# Patient Record
Sex: MALE | Race: WHITE | NOT HISPANIC OR LATINO | URBAN - METROPOLITAN AREA
[De-identification: names, ages, dates, MRNs, and addresses within clinical notes are randomized per-mention and may not be internally consistent; named-entity substitution may affect disease eponyms.]

---

## 2022-09-13 ENCOUNTER — INPATIENT (INPATIENT)
Facility: HOSPITAL | Age: 37
LOS: 0 days | Discharge: HOME | End: 2022-09-14
Attending: SURGERY | Admitting: SURGERY

## 2022-09-13 VITALS
TEMPERATURE: 100 F | HEART RATE: 90 BPM | SYSTOLIC BLOOD PRESSURE: 123 MMHG | OXYGEN SATURATION: 100 % | RESPIRATION RATE: 18 BRPM | WEIGHT: 169.98 LBS | DIASTOLIC BLOOD PRESSURE: 76 MMHG

## 2022-09-13 LAB
ALBUMIN SERPL ELPH-MCNC: 4.4 G/DL — SIGNIFICANT CHANGE UP (ref 3.5–5.2)
ALP SERPL-CCNC: 65 U/L — SIGNIFICANT CHANGE UP (ref 30–115)
ALT FLD-CCNC: 18 U/L — SIGNIFICANT CHANGE UP (ref 0–41)
ANION GAP SERPL CALC-SCNC: 11 MMOL/L — SIGNIFICANT CHANGE UP (ref 7–14)
APPEARANCE UR: CLEAR — SIGNIFICANT CHANGE UP
APTT BLD: 27.6 SEC — SIGNIFICANT CHANGE UP (ref 27–39.2)
AST SERPL-CCNC: 20 U/L — SIGNIFICANT CHANGE UP (ref 0–41)
BASOPHILS # BLD AUTO: 0.05 K/UL — SIGNIFICANT CHANGE UP (ref 0–0.2)
BASOPHILS NFR BLD AUTO: 0.4 % — SIGNIFICANT CHANGE UP (ref 0–1)
BILIRUB DIRECT SERPL-MCNC: <0.2 MG/DL — SIGNIFICANT CHANGE UP (ref 0–0.3)
BILIRUB INDIRECT FLD-MCNC: >0.6 MG/DL — SIGNIFICANT CHANGE UP (ref 0.2–1.2)
BILIRUB SERPL-MCNC: 0.8 MG/DL — SIGNIFICANT CHANGE UP (ref 0.2–1.2)
BILIRUB UR-MCNC: NEGATIVE — SIGNIFICANT CHANGE UP
BLD GP AB SCN SERPL QL: SIGNIFICANT CHANGE UP
BUN SERPL-MCNC: 10 MG/DL — SIGNIFICANT CHANGE UP (ref 10–20)
CALCIUM SERPL-MCNC: 9.3 MG/DL — SIGNIFICANT CHANGE UP (ref 8.4–10.4)
CHLORIDE SERPL-SCNC: 100 MMOL/L — SIGNIFICANT CHANGE UP (ref 98–110)
CO2 SERPL-SCNC: 24 MMOL/L — SIGNIFICANT CHANGE UP (ref 17–32)
COLOR SPEC: SIGNIFICANT CHANGE UP
CREAT SERPL-MCNC: 1 MG/DL — SIGNIFICANT CHANGE UP (ref 0.7–1.5)
DIFF PNL FLD: NEGATIVE — SIGNIFICANT CHANGE UP
EGFR: 101 ML/MIN/1.73M2 — SIGNIFICANT CHANGE UP
EOSINOPHIL # BLD AUTO: 0.1 K/UL — SIGNIFICANT CHANGE UP (ref 0–0.7)
EOSINOPHIL NFR BLD AUTO: 0.7 % — SIGNIFICANT CHANGE UP (ref 0–8)
GLUCOSE SERPL-MCNC: 104 MG/DL — HIGH (ref 70–99)
GLUCOSE UR QL: NEGATIVE — SIGNIFICANT CHANGE UP
HCT VFR BLD CALC: 41.3 % — LOW (ref 42–52)
HGB BLD-MCNC: 14.3 G/DL — SIGNIFICANT CHANGE UP (ref 14–18)
IMM GRANULOCYTES NFR BLD AUTO: 0.3 % — SIGNIFICANT CHANGE UP (ref 0.1–0.3)
INR BLD: 1.04 RATIO — SIGNIFICANT CHANGE UP (ref 0.65–1.3)
KETONES UR-MCNC: ABNORMAL
LACTATE SERPL-SCNC: 0.7 MMOL/L — SIGNIFICANT CHANGE UP (ref 0.7–2)
LEUKOCYTE ESTERASE UR-ACNC: NEGATIVE — SIGNIFICANT CHANGE UP
LIDOCAIN IGE QN: 27 U/L — SIGNIFICANT CHANGE UP (ref 7–60)
LYMPHOCYTES # BLD AUTO: 1.49 K/UL — SIGNIFICANT CHANGE UP (ref 1.2–3.4)
LYMPHOCYTES # BLD AUTO: 10.9 % — LOW (ref 20.5–51.1)
MCHC RBC-ENTMCNC: 30.7 PG — SIGNIFICANT CHANGE UP (ref 27–31)
MCHC RBC-ENTMCNC: 34.6 G/DL — SIGNIFICANT CHANGE UP (ref 32–37)
MCV RBC AUTO: 88.6 FL — SIGNIFICANT CHANGE UP (ref 80–94)
MONOCYTES # BLD AUTO: 1.45 K/UL — HIGH (ref 0.1–0.6)
MONOCYTES NFR BLD AUTO: 10.6 % — HIGH (ref 1.7–9.3)
NEUTROPHILS # BLD AUTO: 10.53 K/UL — HIGH (ref 1.4–6.5)
NEUTROPHILS NFR BLD AUTO: 77.1 % — HIGH (ref 42.2–75.2)
NITRITE UR-MCNC: NEGATIVE — SIGNIFICANT CHANGE UP
NRBC # BLD: 0 /100 WBCS — SIGNIFICANT CHANGE UP (ref 0–0)
PH UR: 7 — SIGNIFICANT CHANGE UP (ref 5–8)
PLATELET # BLD AUTO: 201 K/UL — SIGNIFICANT CHANGE UP (ref 130–400)
POTASSIUM SERPL-MCNC: 4.3 MMOL/L — SIGNIFICANT CHANGE UP (ref 3.5–5)
POTASSIUM SERPL-SCNC: 4.3 MMOL/L — SIGNIFICANT CHANGE UP (ref 3.5–5)
PROT SERPL-MCNC: 6.9 G/DL — SIGNIFICANT CHANGE UP (ref 6–8)
PROT UR-MCNC: NEGATIVE — SIGNIFICANT CHANGE UP
PROTHROM AB SERPL-ACNC: 12 SEC — SIGNIFICANT CHANGE UP (ref 9.95–12.87)
RBC # BLD: 4.66 M/UL — LOW (ref 4.7–6.1)
RBC # FLD: 12.1 % — SIGNIFICANT CHANGE UP (ref 11.5–14.5)
SARS-COV-2 RNA SPEC QL NAA+PROBE: SIGNIFICANT CHANGE UP
SODIUM SERPL-SCNC: 135 MMOL/L — SIGNIFICANT CHANGE UP (ref 135–146)
SP GR SPEC: 1.02 — SIGNIFICANT CHANGE UP (ref 1.01–1.03)
UROBILINOGEN FLD QL: SIGNIFICANT CHANGE UP
WBC # BLD: 13.66 K/UL — HIGH (ref 4.8–10.8)
WBC # FLD AUTO: 13.66 K/UL — HIGH (ref 4.8–10.8)

## 2022-09-13 PROCEDURE — 99285 EMERGENCY DEPT VISIT HI MDM: CPT

## 2022-09-13 PROCEDURE — 71045 X-RAY EXAM CHEST 1 VIEW: CPT | Mod: 26

## 2022-09-13 PROCEDURE — 93010 ELECTROCARDIOGRAM REPORT: CPT

## 2022-09-13 PROCEDURE — 88304 TISSUE EXAM BY PATHOLOGIST: CPT | Mod: 26

## 2022-09-13 PROCEDURE — 74177 CT ABD & PELVIS W/CONTRAST: CPT | Mod: 26,MA

## 2022-09-13 RX ORDER — METRONIDAZOLE 500 MG
500 TABLET ORAL ONCE
Refills: 0 | Status: DISCONTINUED | OUTPATIENT
Start: 2022-09-13 | End: 2022-09-14

## 2022-09-13 RX ORDER — KETOROLAC TROMETHAMINE 30 MG/ML
15 SYRINGE (ML) INJECTION ONCE
Refills: 0 | Status: DISCONTINUED | OUTPATIENT
Start: 2022-09-13 | End: 2022-09-13

## 2022-09-13 RX ORDER — HYDROMORPHONE HYDROCHLORIDE 2 MG/ML
0.5 INJECTION INTRAMUSCULAR; INTRAVENOUS; SUBCUTANEOUS
Refills: 0 | Status: DISCONTINUED | OUTPATIENT
Start: 2022-09-13 | End: 2022-09-14

## 2022-09-13 RX ORDER — HYDROMORPHONE HYDROCHLORIDE 2 MG/ML
1 INJECTION INTRAMUSCULAR; INTRAVENOUS; SUBCUTANEOUS
Refills: 0 | Status: DISCONTINUED | OUTPATIENT
Start: 2022-09-13 | End: 2022-09-14

## 2022-09-13 RX ORDER — OXYCODONE HYDROCHLORIDE 5 MG/1
5 TABLET ORAL EVERY 6 HOURS
Refills: 0 | Status: DISCONTINUED | OUTPATIENT
Start: 2022-09-13 | End: 2022-09-14

## 2022-09-13 RX ORDER — CEFOTETAN DISODIUM 1 G
2 VIAL (EA) INJECTION ONCE
Refills: 0 | Status: DISCONTINUED | OUTPATIENT
Start: 2022-09-13 | End: 2022-09-13

## 2022-09-13 RX ORDER — CIPROFLOXACIN LACTATE 400MG/40ML
400 VIAL (ML) INTRAVENOUS ONCE
Refills: 0 | Status: COMPLETED | OUTPATIENT
Start: 2022-09-13 | End: 2022-09-13

## 2022-09-13 RX ORDER — KETOROLAC TROMETHAMINE 30 MG/ML
30 SYRINGE (ML) INJECTION EVERY 6 HOURS
Refills: 0 | Status: DISCONTINUED | OUTPATIENT
Start: 2022-09-13 | End: 2022-09-14

## 2022-09-13 RX ORDER — ACETAMINOPHEN 500 MG
650 TABLET ORAL EVERY 6 HOURS
Refills: 0 | Status: DISCONTINUED | OUTPATIENT
Start: 2022-09-13 | End: 2022-09-14

## 2022-09-13 RX ORDER — SODIUM CHLORIDE 9 MG/ML
1000 INJECTION, SOLUTION INTRAVENOUS
Refills: 0 | Status: DISCONTINUED | OUTPATIENT
Start: 2022-09-13 | End: 2022-09-13

## 2022-09-13 RX ORDER — SODIUM CHLORIDE 9 MG/ML
1000 INJECTION, SOLUTION INTRAVENOUS
Refills: 0 | Status: DISCONTINUED | OUTPATIENT
Start: 2022-09-13 | End: 2022-09-14

## 2022-09-13 RX ORDER — ENOXAPARIN SODIUM 100 MG/ML
40 INJECTION SUBCUTANEOUS EVERY 24 HOURS
Refills: 0 | Status: DISCONTINUED | OUTPATIENT
Start: 2022-09-13 | End: 2022-09-14

## 2022-09-13 RX ORDER — PANTOPRAZOLE SODIUM 20 MG/1
40 TABLET, DELAYED RELEASE ORAL
Refills: 0 | Status: DISCONTINUED | OUTPATIENT
Start: 2022-09-13 | End: 2022-09-14

## 2022-09-13 RX ORDER — ONDANSETRON 8 MG/1
4 TABLET, FILM COATED ORAL ONCE
Refills: 0 | Status: DISCONTINUED | OUTPATIENT
Start: 2022-09-13 | End: 2022-09-14

## 2022-09-13 RX ORDER — CEFOTETAN DISODIUM 1 G
VIAL (EA) INJECTION
Refills: 0 | Status: DISCONTINUED | OUTPATIENT
Start: 2022-09-13 | End: 2022-09-13

## 2022-09-13 RX ORDER — MEPERIDINE HYDROCHLORIDE 50 MG/ML
12.5 INJECTION INTRAMUSCULAR; INTRAVENOUS; SUBCUTANEOUS
Refills: 0 | Status: DISCONTINUED | OUTPATIENT
Start: 2022-09-13 | End: 2022-09-14

## 2022-09-13 RX ORDER — ENOXAPARIN SODIUM 100 MG/ML
40 INJECTION SUBCUTANEOUS EVERY 24 HOURS
Refills: 0 | Status: DISCONTINUED | OUTPATIENT
Start: 2022-09-13 | End: 2022-09-13

## 2022-09-13 RX ADMIN — Medication 15 MILLIGRAM(S): at 14:57

## 2022-09-13 RX ADMIN — Medication 30 MILLIGRAM(S): at 22:59

## 2022-09-13 RX ADMIN — Medication 30 MILLIGRAM(S): at 23:59

## 2022-09-13 RX ADMIN — Medication 200 MILLIGRAM(S): at 19:58

## 2022-09-13 NOTE — H&P ADULT - NSHPLABSRESULTS_GEN_ALL_CORE
LAB/STUDIES:                        14.3   13.66 )-----------( 201      ( 13 Sep 2022 15:02 )             41.3         135  |  100  |  10  ----------------------------<  104<H>  4.3   |  24  |  1.0    Ca    9.3      13 Sep 2022 15:02    TPro  6.9  /  Alb  4.4  /  TBili  0.8  /  DBili  <0.2  /  AST  20  /  ALT  18  /  AlkPhos  65      PT/INR - ( 13 Sep 2022 15:02 )   PT: 12.00 sec;   INR: 1.04 ratio         PTT - ( 13 Sep 2022 15:02 )  PTT:27.6 sec  LIVER FUNCTIONS - ( 13 Sep 2022 15:02 )  Alb: 4.4 g/dL / Pro: 6.9 g/dL / ALK PHOS: 65 U/L / ALT: 18 U/L / AST: 20 U/L / GGT: x           Urinalysis Basic - ( 13 Sep 2022 15:11 )    Color: Light Yellow / Appearance: Clear / S.017 / pH: x  Gluc: x / Ketone: Small  / Bili: Negative / Urobili: <2 mg/dL   Blood: x / Protein: Negative / Nitrite: Negative   Leuk Esterase: Negative / RBC: x / WBC x   Sq Epi: x / Non Sq Epi: x / Bacteria: x                  IMAGING:  < from: CT Abdomen and Pelvis w/ IV Cont (22 @ 15:38) >  IMPRESSION:    1. Findings are compatible with acute appendicitis with small free pelvic   fluid; no focal drainable fluid collection or free intraperitoneal air.    --- End of Report ---    < end of copied text >

## 2022-09-13 NOTE — ED PROVIDER NOTE - OBJECTIVE STATEMENT
36 year old male, no past medical history, who presents with rlq pain. patient with periumbilical pain that began yesterday, now localized to rlq. patient reports pain constant, non-radiating, sharp. no aggravating/alleviating symptoms. denies fever, chills, chest pain, shortness of breath, back pain, nausea/vomiting/diarrhea, urinary symptoms, testicular pain. no hx similar. no hx abd surgeries.

## 2022-09-13 NOTE — H&P ADULT - ASSESSMENT
36M w/ no PMH/PSH who presents with clinical and radiographic findings consistent with acute appendicitis.    Plan:   -Added on to OR for laparoscopic appendectomy, possible exploratory laparotomy   -Consent in chart   -Admit to surgery service   -NPO, IVF   -IV Cipro/Flagyl 2/2 penicillin allergy   -CXR, EKG   -STAT Covid   -Preop labs reviewed   -Discussed with attending 36M w/ no PMH/PSH who presents with clinical and radiographic findings consistent with acute appendicitis.    Plan:   -Added on to OR for laparoscopic appendectomy, possible exploratory laparotomy   -Consent in chart   -Admit to surgery service   -NPO, IVF   -IV Cipro/Flagyl 2/2 penicillin allergy   -STAT Covid   -Preop labs reviewed   -Discussed with attending

## 2022-09-13 NOTE — H&P ADULT - HISTORY OF PRESENT ILLNESS
SURGERY CONSULT NOTE    Patient: MICHELE ALICEA , 36y ([birth year])Male   MRN: [id number]  Location: [facility] ER Hold 024 A  Visit: 09-13-22 Inpatient  Date: 09-13-22 @ 17:41    HPI: 36M w/ no PMH/PSH who presents with sudden onset of RLQ abdominal pain last night, persisted until this afternoon.  Endorses decreased appetite and PO intake.  Denies associated nausea or emesis, no changes in bowel function, no dysuria.  Patient seen bedside in ED, AAOx3, NAD, conversational, AVSS, abdomen soft, non-distended, tender to light palpation in RLQ.  Labs significant for leukocytosis of 13.  CT performed with findings consistent with acute appendicitis; dilated appendix to 1cm, surrounding inflammatory changes, +appendicolith, no evidence of perforation or abscess.  Patient started on cipro/flagyl in ED secondary to a penicillin allergy, IVF, NPO, preop  labs sent.        PAST MEDICAL & SURGICAL HISTORY:      Home Medications:  none          MEDICATIONS  (STANDING):  ciprofloxacin   IVPB 400 milliGRAM(s) IV Intermittent once  lactated ringers. 1000 milliLiter(s) (125 mL/Hr) IV Continuous <Continuous>  metroNIDAZOLE  IVPB 500 milliGRAM(s) IV Intermittent Once    MEDICATIONS  (PRN):         SURGERY CONSULT NOTE    Patient: MICHELE ALICEA , 36y ([birth year])Male   MRN: [id number]  Location: [facility] ER Hold 024 A  Visit: 09-13-22 Inpatient  Date: 09-13-22 @ 17:41    HPI: 36M w/ no PMH/PSH who presents with sudden onset of RLQ abdominal pain last night, persisted until this afternoon.  Endorses decreased appetite and PO intake.  Denies associated nausea or emesis, no changes in bowel function, no dysuria.  Patient seen bedside in ED, AAOx3, NAD, conversational, AVSS, abdomen soft, non-distended, tender to light palpation in RLQ.  Labs significant for leukocytosis of 13.  CT performed with findings consistent with acute appendicitis; dilated appendix to 1cm, surrounding inflammatory changes, +appendicolith, no evidence of perforation or abscess.  Patient started on cipro/flagyl in ED secondary to a penicillin allergy, IVF, NPO, preop  labs sent.        PAST MEDICAL & SURGICAL HISTORY:  none    Home Medications:  none          MEDICATIONS  (STANDING):  ciprofloxacin   IVPB 400 milliGRAM(s) IV Intermittent once  lactated ringers. 1000 milliLiter(s) (125 mL/Hr) IV Continuous <Continuous>  metroNIDAZOLE  IVPB 500 milliGRAM(s) IV Intermittent Once    MEDICATIONS  (PRN):

## 2022-09-13 NOTE — ED PROVIDER NOTE - CLINICAL SUMMARY MEDICAL DECISION MAKING FREE TEXT BOX
36-year-old male presented to ED for right lower quadrant abdominal pain due to appendicitis.  Surgery was consulted and patient was started on antibiotics.  Patient admitted to go to the OR for appendectomy.

## 2022-09-13 NOTE — ED PROVIDER NOTE - NS ED ROS FT
Review of Systems:  	•	CONSTITUTIONAL: no fever, no chills   	•	SKIN: no rash    	•	ENT: no sore throat   	•	RESPIRATORY: no shortness of breath   	•	CARDIAC: no chest pain   	•	GI: +abd pain, no nausea, no vomiting, no diarrhea   	•	GENITO-URINARY: no discharge, no dysuria; no hematuria, no increased urinary frequency  	•	NEUROLOGIC: no weakness, no headache   	•	PSYCH: no anxiety, non suicidal, non homicidal, no hallucination, no depression

## 2022-09-13 NOTE — PRE-ANESTHESIA EVALUATION ADULT - NSANTHOSAYNRD_GEN_A_CORE
denies/No. ARON screening performed.  STOP BANG Legend: 0-2 = LOW Risk; 3-4 = INTERMEDIATE Risk; 5-8 = HIGH Risk

## 2022-09-13 NOTE — ED PROVIDER NOTE - ATTENDING APP SHARED VISIT CONTRIBUTION OF CARE
36-year-old male presents to ED for right lower abdominal pain.  Patient states it started yesterday and he seems to be getting progressively worse.  It is non-radiating no nausea vomiting diarrhea constipation or testicular pain is swelling no dysuria or hematuria.  No abdominal surgeries.    Const: NAD  Eyes: PERRL, no conjunctival injection  HENT:  Neck supple without meningismus   CV: RRR, Warm, well-perfused extremities  RESP: CTA B/L, no tachypnea   GI: soft, RLQ abdominal pain, non-distended  MSK: No gross deformities appreciated  Skin: Warm, dry. No rashes  Neuro: Alert, CNs II-XII grossly intact. Sensation and motor function of extremities grossly intact.  Psych: Appropriate mood and affect.    concern for appendicitis- will do labs, ct, UA

## 2022-09-13 NOTE — BRIEF OPERATIVE NOTE - OPERATION/FINDINGS
Inflamed appendix. Mesoappendix taken with vascular stapler. Appendix transected with JANN purple load.

## 2022-09-13 NOTE — ED PROVIDER NOTE - PHYSICAL EXAMINATION
CONSTITUTIONAL: Well-developed; well-nourished; in no acute distress, nontoxic appearing  SKIN: skin exam is warm and dry  ENT: MMM  CARD: S1, S2 normal, no murmur  RESP: No wheezes, rales or rhonchi. Good air movement bilaterally  ABD: soft; non-distended, +RLQ TTP, no rebound/guarding. no CVAT   EXT: Normal ROM.   NEURO: awake, alert, following commands, oriented, grossly unremarkable. No Focal deficits. GCS 15.   PSYCH: Cooperative, appropriate.

## 2022-09-13 NOTE — H&P ADULT - NSHPPHYSICALEXAM_GEN_ALL_CORE
VITALS:  T(F): 98.6 (09-13-22 @ 16:42), Max: 99.6 (09-13-22 @ 13:24)  HR: 86 (09-13-22 @ 16:42) (86 - 90)  BP: 126/72 (09-13-22 @ 16:42) (123/76 - 126/72)  RR: 18 (09-13-22 @ 16:42) (18 - 18)  SpO2: 92% (09-13-22 @ 16:42) (92% - 100%)    PHYSICAL EXAM:  General: NAD, AAOx3  Cardiac: RRR  Respiratory: Unlabored breathing at rest  Abdomen: Soft, non-distended, tender in RLQ to light palpation  Musculoskeletal: Strength 5/5 BL UE/LE, ROM intact, compartments soft  Neuro: Sensation grossly intact and equal throughout, no focal deficits  Skin: Warm/dry, normal color, no jaundice

## 2022-09-13 NOTE — ED ADULT TRIAGE NOTE - CHIEF COMPLAINT QUOTE
Patient walk in a+ox4 co back pain since yesterday and RLQ pain today. Pt denies n/v/d, fever. Pt last took tylenol 830am with "some relief".

## 2022-09-14 VITALS
HEART RATE: 55 BPM | OXYGEN SATURATION: 98 % | SYSTOLIC BLOOD PRESSURE: 102 MMHG | DIASTOLIC BLOOD PRESSURE: 60 MMHG | TEMPERATURE: 98 F | RESPIRATION RATE: 16 BRPM

## 2022-09-14 LAB
CULTURE RESULTS: SIGNIFICANT CHANGE UP
SPECIMEN SOURCE: SIGNIFICANT CHANGE UP

## 2022-09-14 RX ORDER — OXYCODONE HYDROCHLORIDE 5 MG/1
1 TABLET ORAL
Qty: 5 | Refills: 0
Start: 2022-09-14

## 2022-09-14 RX ADMIN — Medication 650 MILLIGRAM(S): at 05:57

## 2022-09-14 RX ADMIN — ENOXAPARIN SODIUM 40 MILLIGRAM(S): 100 INJECTION SUBCUTANEOUS at 05:28

## 2022-09-14 RX ADMIN — PANTOPRAZOLE SODIUM 40 MILLIGRAM(S): 20 TABLET, DELAYED RELEASE ORAL at 07:28

## 2022-09-14 RX ADMIN — Medication 650 MILLIGRAM(S): at 00:46

## 2022-09-14 RX ADMIN — Medication 650 MILLIGRAM(S): at 05:27

## 2022-09-14 RX ADMIN — Medication 650 MILLIGRAM(S): at 01:16

## 2022-09-14 NOTE — DISCHARGE NOTE NURSING/CASE MANAGEMENT/SOCIAL WORK - PATIENT PORTAL LINK FT
You can access the FollowMyHealth Patient Portal offered by [facility] by registering at the following website: [email or web address]. By joining the [facility]’s FollowMyHealth portal, you will also be able to view your health information using other applications (apps) compatible with our system.

## 2022-09-14 NOTE — DISCHARGE NOTE PROVIDER - HOSPITAL COURSE
35 y/o M with no significant pmhx presented to the ED c/o 1 day of abdominal pain. CT scan showed dilated, fluid-filled appendix measuring 1 cm with obstructing appendicolith and surrounding fat stranding consistent with acute appendicitis.   He was made NPO, given IV antibiotics and fluids and taken to the operating room for a laparoscopic appendectomy.   He tolerated the procedure well and there were no intra-op complications.     Post-op- he is ambulating, voiding, tolerating diet and is pain controlled. He will be discharged home and will follow up with Dr. Malone in the office in 1-2 weeks.

## 2022-09-14 NOTE — CHART NOTE - NSCHARTNOTEFT_GEN_A_CORE
PACU ANESTHESIA ADMISSION NOTE      Procedure: Laparoscopic appendectomy      Post op diagnosis:  Acute appendicitis        __x__  Patent Airway    _x___  Full return of protective reflexes    ____  Full recovery from anesthesia / back to baseline     Vitals:   T: 97.6          R:  11                BP:   96/54               Sat:  99%                 P: 59      Mental Status:  ____ Awake   _____ Alert   ___x__ Drowsy   _____ Sedated    Nausea/Vomiting:  ____ NO  ______Yes,   See Post - Op Orders          Pain Scale (0-10):  _____    Treatment: ____ None    ___x_ See Post - Op/PCA Orders    Post - Operative Fluids:   ____ Oral   __x__ See Post - Op Orders    Plan: Discharge:   ____Home       __x___Floor     _____Critical Care    _____  Other:_________________    Comments: Pt tolerated procedure well, no anesthesia related complications. Care of pt endorsed to PACU, report given to PACU RN. Discharge when criteria are met.
Post Operative Note  Patient: MICHELE ALICEA 36y (1985) Male   MRN: [id number]  Location: [facility] 003 A  Visit: 09-13-22 Inpatient  Date: 09-14-22 @ 00:51    Procedure: Acute appendicitis     S/P Laparoscopic appendectomy    Subjective:   Nausea:  no, Vomiting: no, Ambulating: no, Flatus: no  Pain Assessment: Patient is complaining of MILD pain that is appropriate for post-operative course.     Objective:  Vitals: T(F): 98.3 (09-14-22 @ 00:00), Max: 99.6 (09-13-22 @ 13:24)  HR: 72 (09-14-22 @ 00:00)  BP: 100/55 (09-14-22 @ 00:00) (96/54 - 129/73)  RR: 17 (09-14-22 @ 00:00)  SpO2: 95% (09-14-22 @ 00:00)  Vent Settings:     In:   IV Fluids: lactated ringers. 1000 milliLiter(s) (125 mL/Hr) IV Continuous <Continuous>      Out:   EBL:   Voided Urine:   Armas Catheter: yes no   Drains:   TANNER:  ,   Chest Tube:    NG Tube:     Physical Examination:  General Appearance: NAD,   HEENT: EOMI, sclera non-icteric.  Heart: RRR  Lungs: CTABL  Abdomen:  Soft, non tender, appropriate for post-op course, nondistended. incision clean no bleeidng noted.   MSK/Extremities: Warm & well-perfused. Peripheral pulses intact.  Skin: Warm, dry. No jaundice.     Medications: [Standing]  acetaminophen     Tablet .. 650 milliGRAM(s) Oral every 6 hours  enoxaparin Injectable 40 milliGRAM(s) SubCutaneous every 24 hours  HYDROmorphone  Injectable 0.5 milliGRAM(s) IV Push every 10 minutes PRN  HYDROmorphone  Injectable 1 milliGRAM(s) IV Push every 10 minutes PRN  ketorolac   Injectable 30 milliGRAM(s) IV Push every 6 hours PRN  lactated ringers. 1000 milliLiter(s) IV Continuous <Continuous>  meperidine     Injectable 12.5 milliGRAM(s) IV Push every 10 minutes PRN  metroNIDAZOLE  IVPB 500 milliGRAM(s) IV Intermittent Once  ondansetron Injectable 4 milliGRAM(s) IV Push once PRN  oxyCODONE    IR 5 milliGRAM(s) Oral every 6 hours PRN  pantoprazole    Tablet 40 milliGRAM(s) Oral before breakfast    Medications: [PRN]  acetaminophen     Tablet .. 650 milliGRAM(s) Oral every 6 hours  enoxaparin Injectable 40 milliGRAM(s) SubCutaneous every 24 hours  HYDROmorphone  Injectable 0.5 milliGRAM(s) IV Push every 10 minutes PRN  HYDROmorphone  Injectable 1 milliGRAM(s) IV Push every 10 minutes PRN  ketorolac   Injectable 30 milliGRAM(s) IV Push every 6 hours PRN  lactated ringers. 1000 milliLiter(s) IV Continuous <Continuous>  meperidine     Injectable 12.5 milliGRAM(s) IV Push every 10 minutes PRN  metroNIDAZOLE  IVPB 500 milliGRAM(s) IV Intermittent Once  ondansetron Injectable 4 milliGRAM(s) IV Push once PRN  oxyCODONE    IR 5 milliGRAM(s) Oral every 6 hours PRN  pantoprazole    Tablet 40 milliGRAM(s) Oral before breakfast      DVT PROPHYLAXIS: enoxaparin Injectable 40 milliGRAM(s) SubCutaneous every 24 hours    GI PROPHYLAXIS: pantoprazole    Tablet 40 milliGRAM(s) Oral before breakfast    ANTICOAGULATION:   ANTIBIOTICS:  metroNIDAZOLE  IVPB 500 milliGRAM(s)        Labs:                        14.3   13.66 )-----------( 201      ( 13 Sep 2022 15:02 )             41.3     09-13    135  |  100  |  10  ----------------------------<  104<H>  4.3   |  24  |  1.0    Ca    9.3      13 Sep 2022 15:02    TPro  6.9  /  Alb  4.4  /  TBili  0.8  /  DBili  <0.2  /  AST  20  /  ALT  18  /  AlkPhos  65  09-13    PT/INR - ( 13 Sep 2022 15:02 )   PT: 12.00 sec;   INR: 1.04 ratio         PTT - ( 13 Sep 2022 15:02 )  PTT:27.6 sec    Imaging:  No post-op imaging studies    Assessment:  36yM s/p laparoscopic appendectomy    Plan:  - Continue Pain Medications if necessary  - Encourage ambulation as tolerated  - Monitor urine output   - DVT and GI Prophylaxis  - Monitor wound and dressing for changes,   - regular diet, IVL  - discharge home today       Date/Time: 09-14-22 @ 00:51

## 2022-09-14 NOTE — DISCHARGE NOTE PROVIDER - NSDCCPCAREPLAN_GEN_ALL_CORE_FT
PRINCIPAL DISCHARGE DIAGNOSIS  Diagnosis: Acute appendicitis  Assessment and Plan of Treatment: You came to the hospital with abdominal pain and were found on CT scan to have acute appendicitis.   You went tot he operating room for a laparoscopic appendectomy.   If you are in pain- You may take Tylenol and Motrin every 6 hours as needed.   Oxycodone will be sent to your pharmacy only to take for severe pain.   You should follow up in the office with Dr. Malone in 1-2 weeks. Please call office to make an appointment.   If you experience severe pain, nausea, vomiting, diarrhea, oozing from incisions, fever- call office or report to the nearest Emergency Department.

## 2022-09-14 NOTE — DISCHARGE NOTE NURSING/CASE MANAGEMENT/SOCIAL WORK - NSDCPEFALRISK_GEN_ALL_CORE
For information on Fall & Injury Prevention, visit: [email or web address] OR  [email or web address] OR  https://www.cdc.gov/steadi/patient.html

## 2022-09-14 NOTE — PROGRESS NOTE ADULT - ASSESSMENT
Assessment:  36yM s/p laparoscopic appendectomy    Plan:  - Continue Pain Medications if necessary  - Encourage ambulation as tolerated  - Monitor urine output   - DVT and GI Prophylaxis  - Monitor wound and dressing for changes,   - regular diet, IVL  - discharge home today     spectra [phone]

## 2022-09-14 NOTE — DISCHARGE NOTE PROVIDER - CARE PROVIDER_API CALL
Osiel Malone)  Surgery  [facility] [place] [address]  Phone: [phone]  Fax: [phone]  Follow Up Time: 1 week

## 2022-09-14 NOTE — PROGRESS NOTE ADULT - SUBJECTIVE AND OBJECTIVE BOX
GENERAL SURGERY PROGRESS NOTE    Patient: MICHELE ALICEA , 36y ([birth year])Male   MRN: [id number]  Location: [facility] 003 A  Visit: 22 Inpatient  Date: 22 @ 01:42    Procedure/Dx/Injuries: s/p laparoscopic appendectomy    Events of past 24 hours: no acute post op events    PAST MEDICAL & SURGICAL HISTORY:  No pertinent past medical history      No significant past surgical history    Vitals:   T(F): 98.3 (22 @ 00:00), Max: 99.6 (22 @ 13:24)  HR: 72 (22 @ 00:00)  BP: 100/55 (22 @ 00:00)  RR: 17 (22 @ 00:00)  SpO2: 95% (22 @ 00:00)    Diet, Regular      Fluids: lactated ringers.: Solution, 1000 milliLiter(s) infuse at 125 mL/Hr  Provider's Contact #: [phone]      I & O's:    Bowel Movement: : [] YES [] NO  Flatus: : [] YES [] NO    Physical Examination:  General Appearance: NAD,   HEENT: EOMI, sclera non-icteric.  Heart: RRR  Lungs: CTABL  Abdomen:  Soft, non tender, appropriate for post-op course, nondistended. incision clean no bleeding noted.   MSK/Extremities: Warm & well-perfused. Peripheral pulses intact.    MEDICATIONS  (STANDING):  acetaminophen     Tablet .. 650 milliGRAM(s) Oral every 6 hours  enoxaparin Injectable 40 milliGRAM(s) SubCutaneous every 24 hours  lactated ringers. 1000 milliLiter(s) (125 mL/Hr) IV Continuous <Continuous>  metroNIDAZOLE  IVPB 500 milliGRAM(s) IV Intermittent Once  pantoprazole    Tablet 40 milliGRAM(s) Oral before breakfast    MEDICATIONS  (PRN):  HYDROmorphone  Injectable 0.5 milliGRAM(s) IV Push every 10 minutes PRN Moderate Pain (4 - 6)  HYDROmorphone  Injectable 1 milliGRAM(s) IV Push every 10 minutes PRN Severe Pain (7 - 10)  ketorolac   Injectable 30 milliGRAM(s) IV Push every 6 hours PRN Moderate Pain (4 - 6)  meperidine     Injectable 12.5 milliGRAM(s) IV Push every 10 minutes PRN Shivering  ondansetron Injectable 4 milliGRAM(s) IV Push once PRN Nausea and/or Vomiting  oxyCODONE    IR 5 milliGRAM(s) Oral every 6 hours PRN Severe Pain (7 - 10)      DVT PROPHYLAXIS: enoxaparin Injectable 40 milliGRAM(s) SubCutaneous every 24 hours    GI PROPHYLAXIS: pantoprazole    Tablet 40 milliGRAM(s) Oral before breakfast    ANTICOAGULATION:   ANTIBIOTICS:  metroNIDAZOLE  IVPB 500 milliGRAM(s)            LAB/STUDIES:  Labs:  CAPILLARY BLOOD GLUCOSE                              14.3   13.66 )-----------( 201      ( 13 Sep 2022 15:02 )             41.3       Auto Neutrophil %: 77.1 % (22 @ 15:02)  Auto Immature Granulocyte %: 0.3 % (22 @ 15:02)        135  |  100  |  10  ----------------------------<  104<H>  4.3   |  24  |  1.0      Calcium, Total Serum: 9.3 mg/dL (22 @ 15:02)      LFTs:             6.9  | 0.8  | 20       ------------------[65      ( 13 Sep 2022 15:02 )  4.4  | <0.2 | 18          Lipase:27     Amylase:x         Lactate, Blood: 0.7 mmol/L (22 @ 15:02)      Coags:     12.00  ----< 1.04    ( 13 Sep 2022 15:02 )     27.6       Urinalysis Basic - ( 13 Sep 2022 15:11 )    Color: Light Yellow / Appearance: Clear / S.017 / pH: x  Gluc: x / Ketone: Small  / Bili: Negative / Urobili: <2 mg/dL   Blood: x / Protein: Negative / Nitrite: Negative   Leuk Esterase: Negative / RBC: x / WBC x   Sq Epi: x / Non Sq Epi: x / Bacteria: x        IMAGING:      ACCESS/ DEVICES:  [ x] Peripheral IV  [ ] Central Venous Line	[ ] R	[ ] L	[ ] IJ	[ ] Fem	[ ] SC	Placed:   [ ] Arterial Line		[ ] R	[ ] L	[ ] Fem	[ ] Rad	[ ] Ax	Placed:   [ ] PICC:					[ ] Mediport  [ ] Urinary Catheter,  Date Placed:   [ ] Chest tube: [ ] Right, [ ] Left  [ ] TANNER/Wojciech Drains

## 2022-09-16 DIAGNOSIS — Z88.0 ALLERGY STATUS TO PENICILLIN: ICD-10-CM

## 2022-09-16 DIAGNOSIS — K35.80 UNSPECIFIED ACUTE APPENDICITIS: ICD-10-CM

## 2022-09-16 DIAGNOSIS — Z20.822 CONTACT WITH AND (SUSPECTED) EXPOSURE TO COVID-19: ICD-10-CM

## 2022-09-19 LAB — SURGICAL PATHOLOGY STUDY: SIGNIFICANT CHANGE UP
